# Patient Record
Sex: MALE | URBAN - METROPOLITAN AREA
[De-identification: names, ages, dates, MRNs, and addresses within clinical notes are randomized per-mention and may not be internally consistent; named-entity substitution may affect disease eponyms.]

---

## 2022-05-25 ENCOUNTER — HOSPITAL ENCOUNTER (EMERGENCY)
Facility: CLINIC | Age: 27
End: 2022-05-25

## 2022-05-25 NOTE — ED NOTES
Call from PA from [facility] -     They are sending over a 25 y/o M, that was here last week with L. Shoulder dislocation. It dislocated again.  He's reduced it a couple of times at home as well (he is a previous medic).  But, they feel it's still dislocated.   If it is still dislocated again.   A L. Shoulder MRI arthrogram should be obtained. (they can't get it in clinic until next week).      If shoulder is reduced then do not order a L. Shoulder arthrogram.. Patient has follow up with shoulder specialist already scheduled.     MD Charley Pedro Kara, MD  05/25/22 [id number]